# Patient Record
Sex: MALE | Race: WHITE | Employment: PART TIME | URBAN - METROPOLITAN AREA
[De-identification: names, ages, dates, MRNs, and addresses within clinical notes are randomized per-mention and may not be internally consistent; named-entity substitution may affect disease eponyms.]

---
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
## 2021-08-26 ENCOUNTER — OFFICE VISIT (OUTPATIENT)
Dept: FAMILY MEDICINE CLINIC | Age: 21
End: 2021-08-26
Payer: COMMERCIAL

## 2021-08-26 VITALS
HEIGHT: 71 IN | SYSTOLIC BLOOD PRESSURE: 114 MMHG | OXYGEN SATURATION: 98 % | HEART RATE: 77 BPM | WEIGHT: 168 LBS | DIASTOLIC BLOOD PRESSURE: 72 MMHG | TEMPERATURE: 99.4 F | BODY MASS INDEX: 23.52 KG/M2

## 2021-08-26 DIAGNOSIS — J30.2 SEASONAL ALLERGIES: ICD-10-CM

## 2021-08-26 DIAGNOSIS — Z00.00 PREVENTATIVE HEALTH CARE: Primary | ICD-10-CM

## 2021-08-26 DIAGNOSIS — R53.83 FATIGUE, UNSPECIFIED TYPE: ICD-10-CM

## 2021-08-26 DIAGNOSIS — Z76.89 ENCOUNTER TO ESTABLISH CARE: ICD-10-CM

## 2021-08-26 DIAGNOSIS — L30.9 ECZEMA, UNSPECIFIED TYPE: ICD-10-CM

## 2021-08-26 PROCEDURE — 99385 PREV VISIT NEW AGE 18-39: CPT | Performed by: NURSE PRACTITIONER

## 2021-08-26 SDOH — ECONOMIC STABILITY: FOOD INSECURITY: WITHIN THE PAST 12 MONTHS, THE FOOD YOU BOUGHT JUST DIDN'T LAST AND YOU DIDN'T HAVE MONEY TO GET MORE.: NEVER TRUE

## 2021-08-26 SDOH — ECONOMIC STABILITY: FOOD INSECURITY: WITHIN THE PAST 12 MONTHS, YOU WORRIED THAT YOUR FOOD WOULD RUN OUT BEFORE YOU GOT MONEY TO BUY MORE.: NEVER TRUE

## 2021-08-26 ASSESSMENT — ENCOUNTER SYMPTOMS
SHORTNESS OF BREATH: 0
NAUSEA: 0
SORE THROAT: 0
COUGH: 0
RHINORRHEA: 0
DIARRHEA: 0
ABDOMINAL PAIN: 0
CONSTIPATION: 0
BACK PAIN: 0
VOMITING: 0
ABDOMINAL DISTENTION: 0
CHEST TIGHTNESS: 0

## 2021-08-26 ASSESSMENT — PATIENT HEALTH QUESTIONNAIRE - PHQ9
SUM OF ALL RESPONSES TO PHQ QUESTIONS 1-9: 0
1. LITTLE INTEREST OR PLEASURE IN DOING THINGS: 0
2. FEELING DOWN, DEPRESSED OR HOPELESS: 0
SUM OF ALL RESPONSES TO PHQ QUESTIONS 1-9: 0
SUM OF ALL RESPONSES TO PHQ9 QUESTIONS 1 & 2: 0
SUM OF ALL RESPONSES TO PHQ QUESTIONS 1-9: 0

## 2021-08-26 ASSESSMENT — SOCIAL DETERMINANTS OF HEALTH (SDOH): HOW HARD IS IT FOR YOU TO PAY FOR THE VERY BASICS LIKE FOOD, HOUSING, MEDICAL CARE, AND HEATING?: NOT HARD AT ALL

## 2021-08-26 NOTE — PROGRESS NOTES
Lang Crigler, APRN-CNP  [facility]  [id number]  [place] , [address]  [address] [address]  Dept: [phone]  Dept Fax: [phone]    Patient ID: Jean Claude Chung is a 21 y.o. male. HPI:  Jean Claude Chung is a 21 y.o. male who presents to the office today for a first visit and to establish a relationship with a new primary care physician. Today, the patient complains of felling fatigued, almost to the point of lethargy, weight loss, brain fog, decreased libido, hair falling out and difficulty sleeping. He reports a very strong family history of thyroid disease and is wondering if it could be related. His symptoms have been ongoing for over a year and a half. Pt denies any fever or chills. Pt today denies any HA, chest pain, or SOB. Pt denies any N/V/D/C or abdominal pain. He is in school at [employer] [address] with a dual major in computerr engineering and electrical engineering. 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 He also works out frequently and has been following with a anival. He has been following a high protien diet which includes a lot of chicken, kienwa, rice and beans. He does have a history of eczema and follows with Dr. Mary May (dermatology). Previous office notes, labs and diagnostic studies were reviewed prior to and during encounter. The patient's past medical, surgical, social, and family history as well as her current medications and allergies were reviewed as documented in today's encounter by JODI Pearson.  Preventative Care:     o Smoking: denies  o Alcohol: socially  o Drug use: denies     No current outpatient medications on file prior to visit. No current facility-administered medications on file prior to visit. SUBJECTIVE:     Review of Systems   Constitutional: Positive for fatigue and unexpected weight change (weight loss). Negative for activity change and fever.    HENT: Negative for congestion, ear pain, rhinorrhea and sore throat. Respiratory: Negative for cough, chest tightness and shortness of breath. Cardiovascular: Negative for chest pain and palpitations. Gastrointestinal: Negative for abdominal distention, abdominal pain, constipation, diarrhea, nausea and vomiting. Endocrine: Negative for polydipsia, polyphagia and polyuria. C/o hair falling out   Genitourinary: Negative for difficulty urinating and dysuria. C/o decreased libido   Musculoskeletal: Negative for arthralgias, back pain and myalgias. Skin: Negative for rash. Allergic/Immunologic: Positive for environmental allergies. Neurological: Negative for dizziness, weakness, light-headedness and headaches. C/o brain fog   Hematological: Negative for adenopathy. Psychiatric/Behavioral: Positive for sleep disturbance. Negative for agitation and behavioral problems. The patient is not nervous/anxious. OBJECTIVE:  /72   Pulse 77   Temp 99.4 °F (37.4 °C)   Ht 5' 11\" (1.803 m)   Wt 168 lb (76.2 kg)   SpO2 98%   BMI 23.43 kg/m²     Physical Exam  Vitals and nursing note reviewed. Constitutional:       General: He is not in acute distress. Appearance: Normal appearance. He is well-developed. HENT:      Head: Normocephalic and atraumatic. Cardiovascular:      Rate and Rhythm: Normal rate and regular rhythm. Heart sounds: Normal heart sounds. No murmur heard. Pulmonary:      Effort: Pulmonary effort is normal. No respiratory distress. Breath sounds: Normal breath sounds. Chest:      Chest wall: No tenderness. Abdominal:      General: Bowel sounds are normal.      Palpations: Abdomen is soft. Tenderness: There is no abdominal tenderness. Musculoskeletal:         General: Normal range of motion. Cervical back: Normal range of motion. Right lower leg: No edema. Left lower leg: No edema. Skin:     General: Skin is warm and dry. Findings: No rash. Comments: - flesh-colored faintly erythematous patches noted to neck, antecubital areas and forarms   Neurological:      Mental Status: He is alert and oriented to person, place, and time. Psychiatric:         Mood and Affect: Mood normal.         Behavior: Behavior is cooperative. ASSESSMENT:   Diagnosis Orders   1. Preventative health care  CBC    Comprehensive Metabolic Panel    Hemoglobin A1C    Lipid Panel   2. Encounter to establish care     3. Fatigue, unspecified type  Iron and TIBC    Vitamin B12 & Folate   4. Seasonal allergies       PLAN:  1. Encounter to establish care  2. Preventative health care  - We did discuss in detail the and the recommended preventative screening guidelines (HTN, lipid, DM). - Will order above noted labs and call with results  - Will continue to follow annually and PRN   - CBC; Future  - Comprehensive Metabolic Panel; Future  - Hemoglobin A1C; Future  - Lipid Panel; Future    3. Fatigue, unspecified type  - Will order above noted labs and call with results  - Iron and TIBC; Future  - Vitamin B12 & Folate; Future    4. Seasonal allergies  - Stable: Medication re-filled as needed, con't medications as prescribed, con't current tx plan         5. Eczema, unspecified type  - Stable: Medication re-filled as needed, con't medications as prescribed, con't current tx plan   - Continue with Triamcinolone cream PRN as previously prescribed. - Discussed symptomatic treatment of eczema including Dove sensitive skin soap and Eucerin lotion.  - Recommended emollient cream use within 3 minutes of getting out of the bath. - Avoid hot bath water or prolonged tub time. - Bathe daily, pat dry.   - Cover affected areas with hydrocortisone cream, then cover entire skin with lotion.    - Medications, labs, diagnostic studies, consultations and follow-up as documented in this encounter.    - On this date August 26, 2021,  I have spent greater than 50% of this visit reviewing previous

## 2021-08-27 LAB
ALBUMIN SERPL-MCNC: 4.6 G/DL
ALP BLD-CCNC: 90 U/L
ALT SERPL-CCNC: 20 U/L
ANION GAP SERPL CALCULATED.3IONS-SCNC: 5 MMOL/L
AST SERPL-CCNC: 23 U/L
AVERAGE GLUCOSE: 97
BASOPHILS ABSOLUTE: NORMAL
BASOPHILS RELATIVE PERCENT: NORMAL
BILIRUB SERPL-MCNC: 1.2 MG/DL (ref 0.1–1.4)
BUN BLDV-MCNC: 19 MG/DL
CALCIUM SERPL-MCNC: 9.5 MG/DL
CHLORIDE BLD-SCNC: 104 MMOL/L
CHOLESTEROL, TOTAL: 149 MG/DL
CHOLESTEROL/HDL RATIO: 2.8
CO2: 30 MMOL/L
CREAT SERPL-MCNC: 0.92 MG/DL
EOSINOPHILS ABSOLUTE: NORMAL
EOSINOPHILS RELATIVE PERCENT: NORMAL
FOLATE: 13.7
GFR CALCULATED: >60
GLUCOSE BLD-MCNC: 95 MG/DL
HBA1C MFR BLD: 5 %
HCT VFR BLD CALC: 42.6 % (ref 41–53)
HDLC SERPL-MCNC: 54 MG/DL (ref 35–70)
HEMOGLOBIN: 14.5 G/DL (ref 13.5–17.5)
IRON: 148
LDL CHOLESTEROL CALCULATED: 86 MG/DL (ref 0–160)
LYMPHOCYTES ABSOLUTE: NORMAL
LYMPHOCYTES RELATIVE PERCENT: NORMAL
MCH RBC QN AUTO: NORMAL PG
MCHC RBC AUTO-ENTMCNC: NORMAL G/DL
MCV RBC AUTO: NORMAL FL
MONOCYTES ABSOLUTE: NORMAL
MONOCYTES RELATIVE PERCENT: NORMAL
NEUTROPHILS ABSOLUTE: NORMAL
NEUTROPHILS RELATIVE PERCENT: NORMAL
NONHDLC SERPL-MCNC: NORMAL MG/DL
PLATELET # BLD: 207 K/ΜL
PMV BLD AUTO: NORMAL FL
POTASSIUM SERPL-SCNC: 4.2 MMOL/L
RBC # BLD: NORMAL 10*6/UL
SODIUM BLD-SCNC: 139 MMOL/L
TOTAL IRON BINDING CAPACITY: 384
TOTAL PROTEIN: 7.5
TRIGL SERPL-MCNC: 47 MG/DL
VITAMIN B-12: 337
VLDLC SERPL CALC-MCNC: 9 MG/DL
WBC # BLD: 3.9 10^3/ML

## 2021-08-30 DIAGNOSIS — R53.83 FATIGUE, UNSPECIFIED TYPE: ICD-10-CM

## 2021-08-30 DIAGNOSIS — Z00.00 PREVENTATIVE HEALTH CARE: ICD-10-CM
